# Patient Record
Sex: MALE | Race: WHITE | NOT HISPANIC OR LATINO | URBAN - METROPOLITAN AREA
[De-identification: names, ages, dates, MRNs, and addresses within clinical notes are randomized per-mention and may not be internally consistent; named-entity substitution may affect disease eponyms.]

---

## 2021-09-03 ENCOUNTER — OFFICE (OUTPATIENT)
Dept: URBAN - METROPOLITAN AREA CLINIC 34 | Facility: CLINIC | Age: 36
End: 2021-09-03

## 2021-09-03 VITALS
TEMPERATURE: 97 F | DIASTOLIC BLOOD PRESSURE: 95 MMHG | SYSTOLIC BLOOD PRESSURE: 152 MMHG | WEIGHT: 215.6 LBS | HEIGHT: 76 IN | HEART RATE: 70 BPM

## 2021-09-03 DIAGNOSIS — E73.8 OTHER LACTOSE INTOLERANCE: ICD-10-CM

## 2021-09-03 DIAGNOSIS — K59.1 FUNCTIONAL DIARRHEA: ICD-10-CM

## 2021-09-03 DIAGNOSIS — K21.9 GASTRO-ESOPHAGEAL REFLUX DISEASE WITHOUT ESOPHAGITIS: ICD-10-CM

## 2021-09-03 DIAGNOSIS — R11.0 NAUSEA: ICD-10-CM

## 2021-09-03 PROCEDURE — 99204 OFFICE O/P NEW MOD 45 MIN: CPT | Performed by: PHYSICIAN ASSISTANT

## 2021-09-03 NOTE — SERVICEHPINOTES
Mr. Hutchins is here to discuss GI issues for as long as he can remember. He has acid reflux every morning for many yrs. He has tried Prilosec, Pantoprazole, Pepcid--these did not help (prescription strength). He can vomit up acid and sometimes food. If his acid is particularly bad, he feels nauseated. No early satiety or dysphagia. Mild weight loss in the past year but he eats very picky and he is very physically active (walks at least 5 miles per day). No regular NSAID use. Has seen black stool very rarely, none recently tends to be when he takes pepto bismol. No tobacco or ETOH use. He feels worse when he eats a lot of grease, soda, coffee. No previous EGD.  Stools are a type 4-7 on the BSS and occasionally has type 1 stools on the BSS. He was dx with a likely fissure by a previous gastroenterologist in February had blood per rectum then but no recent blood per rectum. He believes that dairy, grease, and MSG will trigger looser stools. When he has looser stools, he has cramping and urgency to have a BM. He has tried lactaid pills which didn't help. He was given dicyclomine to take at times and it didn't help. His grandfather had colon polyps no family hx of IBD or CRC.

## 2021-10-22 ENCOUNTER — OFFICE (OUTPATIENT)
Dept: URBAN - METROPOLITAN AREA CLINIC 98 | Facility: CLINIC | Age: 36
End: 2021-10-22
Payer: COMMERCIAL

## 2021-10-22 VITALS
RESPIRATION RATE: 10 BRPM | HEART RATE: 76 BPM | DIASTOLIC BLOOD PRESSURE: 69 MMHG | SYSTOLIC BLOOD PRESSURE: 119 MMHG | DIASTOLIC BLOOD PRESSURE: 79 MMHG | SYSTOLIC BLOOD PRESSURE: 128 MMHG | SYSTOLIC BLOOD PRESSURE: 101 MMHG | RESPIRATION RATE: 24 BRPM | SYSTOLIC BLOOD PRESSURE: 126 MMHG | HEART RATE: 74 BPM | HEART RATE: 98 BPM | TEMPERATURE: 98.1 F | SYSTOLIC BLOOD PRESSURE: 139 MMHG | HEART RATE: 68 BPM | DIASTOLIC BLOOD PRESSURE: 75 MMHG | OXYGEN SATURATION: 98 % | HEART RATE: 61 BPM | HEART RATE: 73 BPM | SYSTOLIC BLOOD PRESSURE: 112 MMHG | RESPIRATION RATE: 22 BRPM | RESPIRATION RATE: 26 BRPM | HEIGHT: 76 IN | RESPIRATION RATE: 16 BRPM | OXYGEN SATURATION: 92 % | HEART RATE: 69 BPM | TEMPERATURE: 97.9 F | OXYGEN SATURATION: 97 % | DIASTOLIC BLOOD PRESSURE: 97 MMHG | RESPIRATION RATE: 20 BRPM | OXYGEN SATURATION: 99 % | DIASTOLIC BLOOD PRESSURE: 89 MMHG | OXYGEN SATURATION: 100 % | HEART RATE: 60 BPM | WEIGHT: 215 LBS | HEART RATE: 54 BPM | DIASTOLIC BLOOD PRESSURE: 74 MMHG | HEART RATE: 94 BPM | SYSTOLIC BLOOD PRESSURE: 118 MMHG | RESPIRATION RATE: 17 BRPM | DIASTOLIC BLOOD PRESSURE: 80 MMHG | SYSTOLIC BLOOD PRESSURE: 127 MMHG | DIASTOLIC BLOOD PRESSURE: 70 MMHG

## 2021-10-22 DIAGNOSIS — K21.9 GASTRO-ESOPHAGEAL REFLUX DISEASE WITHOUT ESOPHAGITIS: ICD-10-CM

## 2021-10-22 DIAGNOSIS — K22.89 OTHER SPECIFIED DISEASE OF ESOPHAGUS: ICD-10-CM

## 2021-10-22 DIAGNOSIS — K31.89 OTHER DISEASES OF STOMACH AND DUODENUM: ICD-10-CM

## 2021-10-22 DIAGNOSIS — D12.5 BENIGN NEOPLASM OF SIGMOID COLON: ICD-10-CM

## 2021-10-22 DIAGNOSIS — R19.7 DIARRHEA, UNSPECIFIED: ICD-10-CM

## 2021-10-22 DIAGNOSIS — K57.30 DIVERTICULOSIS OF LARGE INTESTINE WITHOUT PERFORATION OR ABS: ICD-10-CM

## 2021-10-22 DIAGNOSIS — D12.3 BENIGN NEOPLASM OF TRANSVERSE COLON: ICD-10-CM

## 2021-10-22 DIAGNOSIS — D12.2 BENIGN NEOPLASM OF ASCENDING COLON: ICD-10-CM

## 2021-10-22 PROBLEM — K63.5 POLYP OF COLON: Status: ACTIVE | Noted: 2021-10-22

## 2021-10-22 RX ORDER — ESOMEPRAZOLE MAGNESIUM 40 MG/1
80 CAPSULE, DELAYED RELEASE ORAL
Qty: 120 | Refills: 3 | Status: COMPLETED
Start: 2021-10-22 | End: 2022-04-06

## 2021-10-25 PROBLEM — K25.9 GASTRIC ULCER, UNSPECIFIED (ACUTE/CHRONIC): Status: ACTIVE | Noted: 2021-10-25

## 2021-11-23 ENCOUNTER — OFFICE (OUTPATIENT)
Dept: URBAN - METROPOLITAN AREA TELEHEALTH 12 | Facility: TELEHEALTH | Age: 36
End: 2021-11-23

## 2021-11-23 VITALS — HEIGHT: 76 IN | WEIGHT: 215 LBS

## 2021-11-23 DIAGNOSIS — K29.00 ACUTE GASTRITIS WITHOUT BLEEDING: ICD-10-CM

## 2021-11-23 DIAGNOSIS — K21.9 GASTRO-ESOPHAGEAL REFLUX DISEASE WITHOUT ESOPHAGITIS: ICD-10-CM

## 2021-11-23 PROCEDURE — 99213 OFFICE O/P EST LOW 20 MIN: CPT | Performed by: PHYSICIAN ASSISTANT

## 2021-11-23 NOTE — SERVICEHPINOTES
PATIENT VERIFIED BY DATE OF BIRTH AND NAME. Patient has been consented for this telecommunication visit.   Mr. Hutchins is here for f/u regarding recent EGD/Colonoscopy. These tests were ordered d/t chronic acid reflux, nausea, and diarrhea. EGD showed severe gastritis and esophagitis. The colonoscopy showed SSP and TA and random bxs were negative, given a 5 yr recall regarding the colonoscopy and a 2 month recall regarding the EGD. His PPI was also doubled d/t the gastritis. Increasing the Nexium to BID has helped a little bit. He never picked up the sucralfate (wasn't sure what it was for). Doesn't take NSAIDs. We have reviewed the GERD diet. He has many questions which are answered today. ROS as per HPI and otherwise is unremarkable.

## 2021-12-23 ENCOUNTER — OFFICE (OUTPATIENT)
Dept: URBAN - METROPOLITAN AREA CLINIC 98 | Facility: CLINIC | Age: 36
End: 2021-12-23

## 2021-12-23 ENCOUNTER — OFFICE (OUTPATIENT)
Dept: URBAN - METROPOLITAN AREA PATHOLOGY 18 | Facility: PATHOLOGY | Age: 36
End: 2021-12-23

## 2021-12-23 VITALS
DIASTOLIC BLOOD PRESSURE: 65 MMHG | OXYGEN SATURATION: 98 % | SYSTOLIC BLOOD PRESSURE: 126 MMHG | HEART RATE: 54 BPM | DIASTOLIC BLOOD PRESSURE: 75 MMHG | SYSTOLIC BLOOD PRESSURE: 113 MMHG | HEART RATE: 86 BPM | TEMPERATURE: 97.7 F | RESPIRATION RATE: 13 BRPM | SYSTOLIC BLOOD PRESSURE: 144 MMHG | SYSTOLIC BLOOD PRESSURE: 147 MMHG | DIASTOLIC BLOOD PRESSURE: 76 MMHG | DIASTOLIC BLOOD PRESSURE: 77 MMHG | TEMPERATURE: 97.5 F | DIASTOLIC BLOOD PRESSURE: 78 MMHG | RESPIRATION RATE: 16 BRPM | HEART RATE: 78 BPM | SYSTOLIC BLOOD PRESSURE: 127 MMHG | HEIGHT: 76 IN | HEART RATE: 75 BPM | SYSTOLIC BLOOD PRESSURE: 106 MMHG | HEART RATE: 80 BPM | DIASTOLIC BLOOD PRESSURE: 93 MMHG | OXYGEN SATURATION: 97 % | WEIGHT: 210 LBS | RESPIRATION RATE: 15 BRPM

## 2021-12-23 DIAGNOSIS — K20.80 OTHER ESOPHAGITIS WITHOUT BLEEDING: ICD-10-CM

## 2021-12-23 DIAGNOSIS — K29.60 OTHER GASTRITIS WITHOUT BLEEDING: ICD-10-CM

## 2021-12-23 DIAGNOSIS — K22.89 OTHER SPECIFIED DISEASE OF ESOPHAGUS: ICD-10-CM

## 2021-12-23 PROBLEM — K29.01: Status: ACTIVE | Noted: 2021-12-23

## 2021-12-23 PROCEDURE — 88312 SPECIAL STAINS GROUP 1: CPT | Performed by: PATHOLOGY

## 2021-12-23 PROCEDURE — 88342 IMHCHEM/IMCYTCHM 1ST ANTB: CPT | Performed by: PATHOLOGY

## 2021-12-23 PROCEDURE — 88313 SPECIAL STAINS GROUP 2: CPT | Performed by: PATHOLOGY

## 2021-12-23 PROCEDURE — 88305 TISSUE EXAM BY PATHOLOGIST: CPT | Performed by: PATHOLOGY

## 2022-04-06 ENCOUNTER — TELEHEALTH PROVIDED OTHER THAN IN PATIENT'S HOME (OUTPATIENT)
Dept: URBAN - METROPOLITAN AREA TELEHEALTH 12 | Facility: TELEHEALTH | Age: 37
End: 2022-04-06

## 2022-04-06 VITALS — WEIGHT: 215 LBS | HEIGHT: 76 IN

## 2022-04-06 DIAGNOSIS — K29.00 ACUTE GASTRITIS WITHOUT BLEEDING: ICD-10-CM

## 2022-04-06 DIAGNOSIS — R19.8 OTHER SPECIFIED SYMPTOMS AND SIGNS INVOLVING THE DIGESTIVE S: ICD-10-CM

## 2022-04-06 PROCEDURE — 99214 OFFICE O/P EST MOD 30 MIN: CPT | Mod: 95 | Performed by: PHYSICIAN ASSISTANT

## 2022-04-06 NOTE — SERVICEHPINOTES
PATIENT VERIFIED BY DATE OF BIRTH AND NAME. Patient has been consented for this telecommunication visit.   SUSAN hartProsper Cuong is here for f/u regarding recent EGD/Colonoscopy. These tests were ordered d/t chronic acid reflux, nausea, and diarrhea. EGD showed severe gastritis and esophagitis. The colonoscopy showed SSP and TA and random bxs were negative, given a 5 yr recall regarding the colonoscopy and a 2 month recall regarding the EGD. His PPI was also doubled d/t the gastritis. The repeat EGD showed mild gastritis and the areas of previously bad gastritis had resolved. All in all he is doing much better than he was. His main complaint is that his stools still alternate between constipation and diarrhea hasn't tried any fiber supplements. He will have heartburn breakthrough about 2 x per week. Hasn't tried Pepcid as needed. ROS as per HPI and otherwise is unremarkable.

## 2025-08-26 ENCOUNTER — OFFICE (OUTPATIENT)
Dept: URBAN - METROPOLITAN AREA CLINIC 102 | Facility: CLINIC | Age: 40
End: 2025-08-26
Payer: COMMERCIAL

## 2025-08-26 VITALS
HEART RATE: 65 BPM | DIASTOLIC BLOOD PRESSURE: 85 MMHG | WEIGHT: 191 LBS | HEIGHT: 76 IN | SYSTOLIC BLOOD PRESSURE: 138 MMHG | TEMPERATURE: 98 F

## 2025-08-26 DIAGNOSIS — K60.2 ANAL FISSURE, UNSPECIFIED: ICD-10-CM

## 2025-08-26 DIAGNOSIS — K62.89 OTHER SPECIFIED DISEASES OF ANUS AND RECTUM: ICD-10-CM

## 2025-08-26 DIAGNOSIS — Z86.0101 PERSONAL HISTORY OF ADENOMATOUS AND SERRATED COLON POLYPS: ICD-10-CM

## 2025-08-26 DIAGNOSIS — K21.9 GASTRO-ESOPHAGEAL REFLUX DISEASE WITHOUT ESOPHAGITIS: ICD-10-CM

## 2025-08-26 PROCEDURE — 99204 OFFICE O/P NEW MOD 45 MIN: CPT | Performed by: INTERNAL MEDICINE
